# Patient Record
Sex: FEMALE | Race: ASIAN | URBAN - METROPOLITAN AREA
[De-identification: names, ages, dates, MRNs, and addresses within clinical notes are randomized per-mention and may not be internally consistent; named-entity substitution may affect disease eponyms.]

---

## 2022-10-18 ENCOUNTER — OFFICE VISIT (OUTPATIENT)
Dept: URBAN - METROPOLITAN AREA CLINIC 52 | Facility: CLINIC | Age: 42
End: 2022-10-18
Payer: COMMERCIAL

## 2022-10-18 ENCOUNTER — LAB OUTSIDE AN ENCOUNTER (OUTPATIENT)
Dept: URBAN - METROPOLITAN AREA CLINIC 52 | Facility: CLINIC | Age: 42
End: 2022-10-18

## 2022-10-18 ENCOUNTER — WEB ENCOUNTER (OUTPATIENT)
Dept: URBAN - METROPOLITAN AREA CLINIC 52 | Facility: CLINIC | Age: 42
End: 2022-10-18

## 2022-10-18 VITALS
HEART RATE: 75 BPM | SYSTOLIC BLOOD PRESSURE: 119 MMHG | TEMPERATURE: 98.1 F | WEIGHT: 132 LBS | BODY MASS INDEX: 26.61 KG/M2 | OXYGEN SATURATION: 99 % | DIASTOLIC BLOOD PRESSURE: 70 MMHG | HEIGHT: 59 IN

## 2022-10-18 DIAGNOSIS — D50.9 IRON DEFICIENCY ANEMIA, UNSPECIFIED IRON DEFICIENCY ANEMIA TYPE: ICD-10-CM

## 2022-10-18 DIAGNOSIS — Z12.11 SCREEN FOR COLON CANCER: ICD-10-CM

## 2022-10-18 PROCEDURE — 99203 OFFICE O/P NEW LOW 30 MIN: CPT | Performed by: INTERNAL MEDICINE

## 2022-10-18 RX ORDER — CHLORHEXIDINE GLUCONATE 1.2 MG/ML
RINSE ORAL
Qty: 473 MILLILITER | Status: ON HOLD | COMMUNITY

## 2022-10-18 RX ORDER — OXYCODONE HYDROCHLORIDE AND ACETAMINOPHEN 10; 325 MG/1; MG/1
TAKE 1 TABLET BY MOUTH FOUR TIMES DAILY AS NEEDED PAIN TABLET ORAL
Qty: 10 EACH | Refills: 0 | Status: ON HOLD | COMMUNITY

## 2022-10-18 RX ORDER — IBUPROFEN 800 MG/1
TAKE 1 TABLET BY MOUTH FOUR TIMES DAILY AS NEEDED PAIN TABLET, FILM COATED ORAL
Qty: 18 EACH | Refills: 0 | Status: ON HOLD | COMMUNITY

## 2022-10-18 RX ORDER — TRIAZOLAM 0.12 MG/1
TABLET ORAL
Qty: 4 TABLET | Status: ON HOLD | COMMUNITY

## 2022-10-18 NOTE — HPI-TODAY'S VISIT:
12/2021-Hgb 10.7. Ferritin 13. Fe sat 17%. Has been on iron X 6 mo Menses-moderate to heavy No abd pain, diarrhea, BRB, melena, NV. Occ HB. Mild. No RX at all. Family-Neg for colon

## 2022-11-02 ENCOUNTER — TELEPHONE ENCOUNTER (OUTPATIENT)
Dept: URBAN - METROPOLITAN AREA CLINIC 52 | Facility: CLINIC | Age: 42
End: 2022-11-02

## 2022-11-21 ENCOUNTER — OFFICE VISIT (OUTPATIENT)
Dept: URBAN - METROPOLITAN AREA SURGERY CENTER 17 | Facility: SURGERY CENTER | Age: 42
End: 2022-11-21

## 2022-12-23 ENCOUNTER — OFFICE VISIT (OUTPATIENT)
Dept: URBAN - METROPOLITAN AREA SURGERY CENTER 17 | Facility: SURGERY CENTER | Age: 42
End: 2022-12-23

## 2022-12-23 ENCOUNTER — CLAIMS CREATED FROM THE CLAIM WINDOW (OUTPATIENT)
Dept: URBAN - METROPOLITAN AREA SURGERY CENTER 17 | Facility: SURGERY CENTER | Age: 42
End: 2022-12-23
Payer: COMMERCIAL

## 2022-12-23 DIAGNOSIS — D50.9 ANEMIA: ICD-10-CM

## 2022-12-23 PROCEDURE — G8907 PT DOC NO EVENTS ON DISCHARG: HCPCS | Performed by: INTERNAL MEDICINE

## 2022-12-23 PROCEDURE — 45378 DIAGNOSTIC COLONOSCOPY: CPT | Performed by: INTERNAL MEDICINE

## 2023-01-24 ENCOUNTER — CLAIMS CREATED FROM THE CLAIM WINDOW (OUTPATIENT)
Dept: URBAN - METROPOLITAN AREA CLINIC 52 | Facility: CLINIC | Age: 43
End: 2023-01-24
Payer: COMMERCIAL

## 2023-01-24 ENCOUNTER — DASHBOARD ENCOUNTERS (OUTPATIENT)
Age: 43
End: 2023-01-24

## 2023-01-24 VITALS
HEIGHT: 59 IN | TEMPERATURE: 97.9 F | BODY MASS INDEX: 27.01 KG/M2 | WEIGHT: 134 LBS | SYSTOLIC BLOOD PRESSURE: 125 MMHG | HEART RATE: 71 BPM | DIASTOLIC BLOOD PRESSURE: 73 MMHG

## 2023-01-24 DIAGNOSIS — D50.9 IRON DEFICIENCY ANEMIA, UNSPECIFIED IRON DEFICIENCY ANEMIA TYPE: ICD-10-CM

## 2023-01-24 DIAGNOSIS — K64.8 INTERNAL HEMORRHOID: ICD-10-CM

## 2023-01-24 DIAGNOSIS — N92.2 EXCESSIVE MENSTRUATION AT PUBERTY: ICD-10-CM

## 2023-01-24 PROBLEM — 237125007: Status: ACTIVE | Noted: 2023-01-24

## 2023-01-24 PROBLEM — 87522002: Status: ACTIVE | Noted: 2022-10-18

## 2023-01-24 PROCEDURE — 99213 OFFICE O/P EST LOW 20 MIN: CPT | Performed by: INTERNAL MEDICINE

## 2023-01-24 NOTE — HPI-TODAY'S VISIT:
12/2021-Hgb 10.7. Ferritin 13. Fe sat 17%. Has been on iron X 6 mo Menses-moderate to heavy No abd pain, diarrhea, BRB, melena, NV. Occ HB. Mild. No RX at all. Family-Neg for colon 12/23/2290-[id number] x small IH.

## 2023-01-26 LAB
ABSOLUTE BASOPHILS: 62
ABSOLUTE EOSINOPHILS: 130
ABSOLUTE LYMPHOCYTES: 1748
ABSOLUTE MONOCYTES: 601
ABSOLUTE NEUTROPHILS: 3658
BASOPHILS: 1
EOSINOPHILS: 2.1
HEMATOCRIT: 38.2
HEMOGLOBIN: 12.6
IRON BIND.CAP.(TIBC): 415
IRON SATURATION: 65
IRON: 268
LYMPHOCYTES: 28.2
MCH: 27.4
MCHC: 33
MCV: 83
MONOCYTES: 9.7
MPV: 11.2
NEUTROPHILS: 59
PLATELET COUNT: 265
RDW: 13.8
RED BLOOD CELL COUNT: 4.6
WHITE BLOOD CELL COUNT: 6.2